# Patient Record
Sex: MALE | Race: WHITE | Employment: OTHER | URBAN - NONMETROPOLITAN AREA
[De-identification: names, ages, dates, MRNs, and addresses within clinical notes are randomized per-mention and may not be internally consistent; named-entity substitution may affect disease eponyms.]

---

## 2018-09-17 ENCOUNTER — RADIANT APPOINTMENT (OUTPATIENT)
Dept: GENERAL RADIOLOGY | Facility: CLINIC | Age: 68
End: 2018-09-17
Attending: FAMILY MEDICINE
Payer: COMMERCIAL

## 2018-09-17 ENCOUNTER — OFFICE VISIT (OUTPATIENT)
Dept: FAMILY MEDICINE | Facility: CLINIC | Age: 68
End: 2018-09-17
Payer: COMMERCIAL

## 2018-09-17 ENCOUNTER — HOSPITAL ENCOUNTER (OUTPATIENT)
Dept: ULTRASOUND IMAGING | Facility: CLINIC | Age: 68
Discharge: HOME | End: 2018-09-17
Attending: FAMILY MEDICINE | Admitting: FAMILY MEDICINE
Payer: MEDICARE

## 2018-09-17 VITALS
OXYGEN SATURATION: 97 % | SYSTOLIC BLOOD PRESSURE: 144 MMHG | TEMPERATURE: 98 F | HEART RATE: 75 BPM | WEIGHT: 228 LBS | BODY MASS INDEX: 35.79 KG/M2 | HEIGHT: 67 IN | DIASTOLIC BLOOD PRESSURE: 78 MMHG | RESPIRATION RATE: 18 BRPM

## 2018-09-17 DIAGNOSIS — R82.90 NONSPECIFIC FINDING ON EXAMINATION OF URINE: ICD-10-CM

## 2018-09-17 DIAGNOSIS — E78.2 MIXED HYPERLIPIDEMIA: ICD-10-CM

## 2018-09-17 DIAGNOSIS — Z00.00 ROUTINE GENERAL MEDICAL EXAMINATION AT A HEALTH CARE FACILITY: Primary | ICD-10-CM

## 2018-09-17 DIAGNOSIS — H91.93 HEARING DIFFICULTY OF BOTH EARS: ICD-10-CM

## 2018-09-17 DIAGNOSIS — Z12.5 SCREENING FOR PROSTATE CANCER: ICD-10-CM

## 2018-09-17 DIAGNOSIS — R50.9 FEVER, UNSPECIFIED FEVER CAUSE: ICD-10-CM

## 2018-09-17 DIAGNOSIS — N30.01 ACUTE CYSTITIS WITH HEMATURIA: ICD-10-CM

## 2018-09-17 DIAGNOSIS — Z11.59 NEED FOR HEPATITIS C SCREENING TEST: ICD-10-CM

## 2018-09-17 DIAGNOSIS — R97.20 ELEVATED PSA, BETWEEN 10 AND LESS THAN 20 NG/ML: Primary | ICD-10-CM

## 2018-09-17 DIAGNOSIS — Z12.11 COLON CANCER SCREENING: ICD-10-CM

## 2018-09-17 DIAGNOSIS — Z71.89 ADVANCED DIRECTIVES, COUNSELING/DISCUSSION: ICD-10-CM

## 2018-09-17 DIAGNOSIS — R06.83 SNORING: ICD-10-CM

## 2018-09-17 LAB
ALBUMIN UR-MCNC: 100 MG/DL
ANION GAP SERPL CALCULATED.3IONS-SCNC: 3 MMOL/L (ref 3–14)
APPEARANCE UR: ABNORMAL
BACTERIA #/AREA URNS HPF: ABNORMAL /HPF
BILIRUB UR QL STRIP: NEGATIVE
BUN SERPL-MCNC: 11 MG/DL (ref 7–30)
CALCIUM SERPL-MCNC: 9.4 MG/DL (ref 8.5–10.1)
CHLORIDE SERPL-SCNC: 101 MMOL/L (ref 94–109)
CHOLEST SERPL-MCNC: 186 MG/DL
CO2 SERPL-SCNC: 31 MMOL/L (ref 20–32)
COLOR UR AUTO: YELLOW
CREAT SERPL-MCNC: 0.79 MG/DL (ref 0.66–1.25)
ERYTHROCYTE [DISTWIDTH] IN BLOOD BY AUTOMATED COUNT: 12.9 % (ref 10–15)
GFR SERPL CREATININE-BSD FRML MDRD: >90 ML/MIN/1.7M2
GLUCOSE SERPL-MCNC: 100 MG/DL (ref 70–99)
GLUCOSE UR STRIP-MCNC: NEGATIVE MG/DL
HCT VFR BLD AUTO: 48 % (ref 40–53)
HDLC SERPL-MCNC: 38 MG/DL
HGB BLD-MCNC: 15.5 G/DL (ref 13.3–17.7)
HGB UR QL STRIP: ABNORMAL
KETONES UR STRIP-MCNC: NEGATIVE MG/DL
LDLC SERPL CALC-MCNC: 111 MG/DL
LEUKOCYTE ESTERASE UR QL STRIP: ABNORMAL
MCH RBC QN AUTO: 29 PG (ref 26.5–33)
MCHC RBC AUTO-ENTMCNC: 32.3 G/DL (ref 31.5–36.5)
MCV RBC AUTO: 90 FL (ref 78–100)
NITRATE UR QL: POSITIVE
NON-SQ EPI CELLS #/AREA URNS LPF: ABNORMAL /LPF
NONHDLC SERPL-MCNC: 148 MG/DL
PH UR STRIP: 5.5 PH (ref 5–7)
PLATELET # BLD AUTO: 381 10E9/L (ref 150–450)
POTASSIUM SERPL-SCNC: 4.4 MMOL/L (ref 3.4–5.3)
PSA SERPL-ACNC: 17.7 UG/L (ref 0–4)
RBC # BLD AUTO: 5.34 10E12/L (ref 4.4–5.9)
RBC #/AREA URNS AUTO: ABNORMAL /HPF
SODIUM SERPL-SCNC: 135 MMOL/L (ref 133–144)
SOURCE: ABNORMAL
SP GR UR STRIP: 1.01 (ref 1–1.03)
TRIGL SERPL-MCNC: 183 MG/DL
UROBILINOGEN UR STRIP-ACNC: 0.2 EU/DL (ref 0.2–1)
WBC # BLD AUTO: 12.4 10E9/L (ref 4–11)
WBC #/AREA URNS AUTO: >100 /HPF

## 2018-09-17 PROCEDURE — 87186 SC STD MICRODIL/AGAR DIL: CPT | Performed by: FAMILY MEDICINE

## 2018-09-17 PROCEDURE — 36415 COLL VENOUS BLD VENIPUNCTURE: CPT | Performed by: FAMILY MEDICINE

## 2018-09-17 PROCEDURE — 87086 URINE CULTURE/COLONY COUNT: CPT | Performed by: FAMILY MEDICINE

## 2018-09-17 PROCEDURE — G0103 PSA SCREENING: HCPCS | Performed by: FAMILY MEDICINE

## 2018-09-17 PROCEDURE — 76770 US EXAM ABDO BACK WALL COMP: CPT

## 2018-09-17 PROCEDURE — 85004 AUTOMATED DIFF WBC COUNT: CPT | Performed by: FAMILY MEDICINE

## 2018-09-17 PROCEDURE — 87088 URINE BACTERIA CULTURE: CPT | Performed by: FAMILY MEDICINE

## 2018-09-17 PROCEDURE — 81001 URINALYSIS AUTO W/SCOPE: CPT | Performed by: FAMILY MEDICINE

## 2018-09-17 PROCEDURE — 85027 COMPLETE CBC AUTOMATED: CPT | Performed by: FAMILY MEDICINE

## 2018-09-17 PROCEDURE — 99213 OFFICE O/P EST LOW 20 MIN: CPT | Mod: 25 | Performed by: FAMILY MEDICINE

## 2018-09-17 PROCEDURE — 71046 X-RAY EXAM CHEST 2 VIEWS: CPT | Mod: FY

## 2018-09-17 PROCEDURE — 80048 BASIC METABOLIC PNL TOTAL CA: CPT | Performed by: FAMILY MEDICINE

## 2018-09-17 PROCEDURE — G0438 PPPS, INITIAL VISIT: HCPCS | Performed by: FAMILY MEDICINE

## 2018-09-17 PROCEDURE — 80061 LIPID PANEL: CPT | Performed by: FAMILY MEDICINE

## 2018-09-17 PROCEDURE — 86803 HEPATITIS C AB TEST: CPT | Performed by: FAMILY MEDICINE

## 2018-09-17 RX ORDER — CIPROFLOXACIN 500 MG/1
500 TABLET, FILM COATED ORAL 2 TIMES DAILY
Qty: 14 TABLET | Refills: 0 | Status: SHIPPED | OUTPATIENT
Start: 2018-09-17

## 2018-09-17 ASSESSMENT — ACTIVITIES OF DAILY LIVING (ADL)
I_NEED_ASSISTANCE_FOR_THE_FOLLOWING_DAILY_ACTIVITIES:: NO ASSISTANCE IS NEEDED
CURRENT_FUNCTION: NO ASSISTANCE NEEDED

## 2018-09-17 NOTE — NURSING NOTE
"Chief Complaint   Patient presents with     Physical       Initial /78 (Cuff Size: Adult Regular)  Pulse 75  Temp 98  F (36.7  C) (Tympanic)  Resp 18  Ht 5' 7.25\" (1.708 m)  Wt 228 lb (103.4 kg)  SpO2 97%  BMI 35.44 kg/m2 Estimated body mass index is 35.44 kg/(m^2) as calculated from the following:    Height as of this encounter: 5' 7.25\" (1.708 m).    Weight as of this encounter: 228 lb (103.4 kg).    Patient presents to the clinic using No DME    Health Maintenance that is potentially due pending provider review:  Colonoscopy/FIT    Gave pt phone number/pended order to schedule mammo and/or colonoscopy(or FIT)    Is there anyone who you would like to be able to receive your results? Not Applicable  If yes have patient fill out ALAN    "

## 2018-09-17 NOTE — PATIENT INSTRUCTIONS
Preventive Health Recommendations:   Male Ages 65 and over    Yearly exam:             See your health care provider every year in order to  o   Review health changes.   o   Discuss preventive care.    o   Review your medicines if your doctor has prescribed any.    Talk with your health care provider about whether you should have a test to screen for prostate cancer (PSA).    Every 3 years, have a diabetes test (fasting glucose). If you are at risk for diabetes, you should have this test more often.    Every 5 years, have a cholesterol test. Have this test more often if you are at risk for high cholesterol or heart disease.     Every 10 years, have a colonoscopy. Or, have a yearly FIT test (stool test). These exams will check for colon cancer.    Talk to with your health care provider about screening for Abdominal Aortic Aneurysm if you have a family history of AAA or have a history of smoking.    Shots:     Get a flu shot each year.     Get a tetanus shot every 10 years.     Talk to your doctor about your pneumonia vaccines. There are now two you should receive - Pneumovax (PPSV 23) and Prevnar (PCV 13).     Talk to your pharmacist about a shingles vaccine.     Talk to your doctor about the hepatitis B vaccine.  Nutrition:     Eat at least 5 servings of fruits and vegetables each day.     Eat whole-grain bread, whole-wheat pasta and brown rice instead of white grains and rice.     Get adequate Calcium and Vitamin D.   Lifestyle    Exercise for at least 150 minutes a week (30 minutes a day, 5 days a week). This will help you control your weight and prevent disease.     Limit alcohol to one drink per day.     No smoking.     Wear sunscreen to prevent skin cancer.     See your dentist every six months for an exam and cleaning.   See your eye doctor every 1 to 2 years to screen for conditions such as glaucoma, macular degeneration, cataracts, etc      * BLADDER INFECTION: Male (Adult)    A bladder infection  "(\"cystitis\" or \"UTI\") usually causes a constant urge to urinate, and a burning when passing urine. Urine may be cloudy, smelly or dark. There may be also be pain in the lower abdomen.  Cystitis in males is not common. It may be caused by a partial blockage in the urinary system that keeps the bladder from emptying completely. This is most often related to an enlarged prostate gland.  HOME CARE:  Drink lots of fluids (at least 6-8 glasses a day). This will flush the bacteria out of your bladder. Cranberry juice has been shown to help clear out the bacteria.  Avoid sexual intercourse until your symptoms are gone.  A bladder infection is treated with antibiotics. You may also be given Pyridium (generic - phenazopyridine) to reduce burning with urination. This will cause urine to become a bright orange color, which can stain clothing.  FOLLOW UP with your doctor or this facility if ALL symptoms have not cleared within five days. It is important to keep your follow up appointment to discuss with your doctor the need for further tests of the urinary tract.  GET PROMPT MEDICAL ATTENTION if any of the following occur:  Fever over 101  F (38.3  C)  No improvement by the third day of treatment  Increasing back or abdominal pain  Repeated vomiting; unable to keep medicine down  Weakness, dizziness or fainting    0158-3398 [employer]. [address] [place] [address], [place] [id number]. All rights reserved. This information is not intended as a substitute for professional medical care. Always follow your healthcare professional's instructions.  This information has been modified by your health care provider with permission from the publisher.    Obstructive Sleep Apnea  Obstructive sleep apnea is a condition that causes your air passages to become narrowed or blocked during sleep. As a result, breathing stops for short periods. Your body wakes up enough for breathing to begin again, though you don't remember it. The cycle " of stopped breathing and brief awakenings can repeat dozens of times a night. This prevents the body from getting to the deeper stages of sleep that are needed for good rest and may cause your body's oxygen level to fall.  Signs of sleep apnea include loud snoring, noisy breathing, and gasping sounds during sleep. Daytime symptoms include waking up tired after a full night's sleep, waking up with headaches, feeling very sleepy or falling asleep during the day, and having problems with memory or concentration.  Risk factors for sleep apnea include:  Being overweight  Being a man, or a woman in menopause  Smoking  Using alcohol or sedating medicines  Having enlarged structures in the nose or throat  Home care  Lifestyle changes that can help treat snoring and sleep apnea include the following:  If you are overweight, lose weight. Talk to your healthcare provider about a weight-loss plan for you.  Avoid alcohol for 3 to 4 hours before bedtime. Avoid sedating medications. Ask your healthcare provider about the medicines you take.  If you smoke, talk to your healthcare provider about ways to quit.  Sleep on your side. This can help prevent gravity from pulling relaxed throat tissues into your breathing passages.  If you have allergies or sinus problems that block your nose, ask your healthcare provider for help.  Follow-up care  Follow up with your healthcare provider, or as advised. A diagnosis of sleep apnea is made with a sleep study. Your healthcare provider can tell you more about this test.  When to seek medical advice  Sleep apnea can make you more likely to have certain health problems. These include high blood pressure, heart attack, stroke, and sexual dysfunction. If you have sleep apnea, talk to your healthcare provider about the best treatments for you.  Date Last Reviewed: 4/1/2017 2000-2017 [employer]. [address] [place] [address], [place] [address]. All rights reserved. This information is not  intended as a substitute for professional medical care. Always follow your healthcare professional's instructions.

## 2018-09-17 NOTE — PROGRESS NOTES
SUBJECTIVE:   Kameron Spear is a 68 year old male who presents for Preventive Visit.    Are you in the first 12 months of your Medicare coverage?  No    Physical   Annual:     Getting at least 3 servings of Calcium per day:  Yes    Bi-annual eye exam:  Yes    Dental care twice a year:  Yes    Sleep apnea or symptoms of sleep apnea:  Excessive snoring    Diet:  Regular (no restrictions)    Frequency of exercise:  2-3 days/week    Duration of exercise:  Less than 15 minutes    Taking medications regularly:  Yes    Medication side effects:  None    Additional concerns today:  YES    Ability to successfully perform activities of daily living: no assistance needed    Home Safety:  No safety concerns identified    Hearing Impairment: difficulty following a conversation in a noisy restaurant or crowded room, feel that people are mumbling or not speaking clearly, difficult to understand a speaker at a public meeting or [employer] service, need to ask people to speak up or repeat themselves, find that men's voices are easier to understand than woman's and difficulty understanding soft or whispered speech        Fall risk:  Fallen 2 or more times in the past year?: No  Any fall with injury in the past year?: No      Reviewed and updated as needed this visit by clinical staff  Tobacco  Allergies  Meds  Med Hx  Surg Hx  Fam Hx  Soc Hx        Reviewed and updated as needed this visit by Provider        Social History   Substance Use Topics     Smoking status: Never Smoker     Smokeless tobacco: Never Used     Alcohol use Yes      Comment: Rare       Alcohol Use 9/17/2018   If you drink alcohol do you typically have greater than 3 drinks per day OR greater than 7 drinks per week? Not Applicable         PROBLEMS TO ADD ON...  Was at the dentist and has a lot of cavity activity going on. Was told to come see  For lab work as that could be an indication of something else going on.     On clindamycin for dental  infection- having some possible side effects from it.  Is very sweaty today. Had a fever earlier this week- 104 earlier. 100.8 this am.       Today's PHQ-2 Score:   PHQ-2 ( 1999 [employer]) 9/17/2018   Q1: Little interest or pleasure in doing things 0   Q2: Feeling down, depressed or hopeless 0   PHQ-2 Score 0   Q1: Little interest or pleasure in doing things Not at all   Q2: Feeling down, depressed or hopeless Not at all   PHQ-2 Score 0       Do you feel safe in your environment - Yes    Do you have a Health Care Directive?: No: Advance care planning reviewed with patient; information given to patient to review.    Current providers sharing in care for this patient include:   Patient Care Team:  No Ref-Primary, Physician as PCP - General    The following health maintenance items are reviewed in Epic and correct as of today:  Health Maintenance   Topic Date Due     PHQ-2 Q1 YR  04/28/1963     HEPATITIS C SCREENING  04/28/1969     COLON CANCER SCREEN (SYSTEM ASSIGNED)  04/28/2001     ADVANCE DIRECTIVE PLANNING Q5 YRS  04/28/2006     TETANUS IMMUNIZATION (SYSTEM ASSIGNED)  10/13/2014     FALL RISK ASSESSMENT  04/28/2016     PNEUMOCOCCAL (1 of 2 - PCV13) 04/28/2016     AORTIC ANEURYSM SCREENING (SYSTEM ASSIGNED)  04/28/2016     LIPID SCREEN Q5 YR MALE (SYSTEM ASSIGNED)  12/23/2016     INFLUENZA VACCINE (1) 09/01/2018     Labs reviewed in EPIC  BP Readings from Last 3 Encounters:   09/17/18 144/78   10/27/11 124/62    Wt Readings from Last 3 Encounters:   09/17/18 228 lb (103.4 kg)   10/27/11 225 lb (102.1 kg)                  Patient Active Problem List   Diagnosis     CARDIOVASCULAR SCREENING; LDL GOAL LESS THAN 160     Advanced directives, counseling/discussion     History reviewed. No pertinent surgical history.    Social History   Substance Use Topics     Smoking status: Never Smoker     Smokeless tobacco: Never Used     Alcohol use Yes      Comment: Rare     History reviewed. No pertinent family history.      Current  "Outpatient Prescriptions   Medication Sig Dispense Refill     Multiple Vitamins TABS Take  by mouth daily.       No Known Allergies  Recent Labs   Lab Test  12/23/11   0835   LDL  144*   HDL  41   TRIG  345*   CR  0.71   GFRESTIMATED  >90   GFRESTBLACK  >90            Review of Systems  Constitutional, HEENT, cardiovascular, pulmonary, GI, , musculoskeletal, neuro, skin, endocrine and psych systems are negative, except as otherwise noted.    OBJECTIVE:   /78 (Cuff Size: Adult Regular)  Pulse 75  Temp 98  F (36.7  C) (Tympanic)  Resp 18  Ht 5' 7.25\" (1.708 m)  Wt 228 lb (103.4 kg)  SpO2 97%  BMI 35.44 kg/m2 Estimated body mass index is 35.44 kg/(m^2) as calculated from the following:    Height as of this encounter: 5' 7.25\" (1.708 m).    Weight as of this encounter: 228 lb (103.4 kg).  Physical Exam  GENERAL: alert, no distress and elderly  EYES: Eyes grossly normal to inspection, PERRL and conjunctivae and sclerae normal  HENT: ear canals and TM's normal, nose and mouth without ulcers or lesions  NECK: no adenopathy, no asymmetry, masses, or scars and thyroid normal to palpation  RESP: lungs clear to auscultation - no rales, rhonchi or wheezes  CV: regular rates and rhythm, normal S1 S2, no S3 or S4 and no murmur, click or rub  ABDOMEN: soft, nontender, no hepatosplenomegaly, no masses and bowel sounds normal  MS: no gross musculoskeletal defects noted, no edema  NEURO: Normal strength and tone, mentation intact and speech normal  PSYCH: mentation appears normal, affect normal/bright    Results for orders placed or performed in visit on 09/17/18   CBC with platelets   Result Value Ref Range    WBC 12.4 (H) 4.0 - 11.0 10e9/L    RBC Count 5.34 4.4 - 5.9 10e12/L    Hemoglobin 15.5 13.3 - 17.7 g/dL    Hematocrit 48.0 40.0 - 53.0 %    MCV 90 78 - 100 fl    MCH 29.0 26.5 - 33.0 pg    MCHC 32.3 31.5 - 36.5 g/dL    RDW 12.9 10.0 - 15.0 %    Platelet Count 381 150 - 450 10e9/L   UA reflex to Microscopic "   Result Value Ref Range    Color Urine Yellow     Appearance Urine Cloudy     Glucose Urine Negative NEG^Negative mg/dL    Bilirubin Urine Negative NEG^Negative    Ketones Urine Negative NEG^Negative mg/dL    Specific Gravity Urine 1.015 1.003 - 1.035    Blood Urine Moderate (A) NEG^Negative    pH Urine 5.5 5.0 - 7.0 pH    Protein Albumin Urine 100 (A) NEG^Negative mg/dL    Urobilinogen Urine 0.2 0.2 - 1.0 EU/dL    Nitrite Urine Positive (A) NEG^Negative    Leukocyte Esterase Urine Large (A) NEG^Negative    Source Midstream Urine    Urine Microscopic   Result Value Ref Range    WBC Urine >100 (A) OTO5^0 - 5 /HPF    RBC Urine 25-50 (A) OTO2^O - 2 /HPF    Squamous Epithelial /LPF Urine Few FEW^Few /LPF    Bacteria Urine Many (A) NEG^Negative /HPF       ASSESSMENT / PLAN:       ICD-10-CM    1. Routine general medical examination at a health care facility Z00.00 Lipid panel reflex to direct LDL Fasting     Basic metabolic panel     CBC with platelets     Urine Microscopic   2. Colon cancer screening Z12.11 GASTROENTEROLOGY ADULT REF PROCEDURE ONLY   3. Advanced directives, counseling/discussion Z71.89    4. Snoring R06.83 SLEEP EVALUATION & MANAGEMENT REFERRAL - [facility]  [phone] (Age 2 and up)   5. Screening for prostate cancer Z12.5 PSA, screen   6. Hearing difficulty of both ears H91.93     has hearing aids which is working well, following audiology in [place].    7. Need for hepatitis C screening test Z11.59 Hepatitis C antibody   8. Fever, unspecified fever cause R50.9 UA reflex to Microscopic     XR Chest 2 Views     Differential   9. Nonspecific finding on examination of urine R82.90 Urine Culture Aerobic Bacterial   10. Acute cystitis with hematuria N30.01 ciprofloxacin (CIPRO) 500 MG tablet     US Renal Complete     UA findings consistent with acute cystitis.  Ciprofloxacin prescribed, common side effect discussed.  Suggested to continue well hydration.  We will do renal  "ultrasound for further evaluation.  CBC, BMP ordered to monitor electrolytes, cell counts and hemoglobin.  Chest x-ray came back unremarkable.  Patient complaining of snoring, obese, high risk of sleep apnea, sleep medicine referral placed.  Prostate cancer screening discussed, risks and benefits explained, patient would like to do PSA screening, order placed.  Hepatitis C screening and screening colonoscopy ordered as per current guidelines.  Follow-up in 2 weeks or earlier if needed.  Patient understood and in agreement with above plan.  All questions answered.        End of Life Planning:  Patient currently has an advanced directive: No.  I have verified the patient's ablity to prepare an advanced directive/make health care decisions.  Literature was provided to assist patient in preparing an advanced directive.    COUNSELING:  Reviewed preventive health counseling, as reflected in patient instructions    BP Readings from Last 1 Encounters:   09/17/18 144/78     Estimated body mass index is 35.44 kg/(m^2) as calculated from the following:    Height as of this encounter: 5' 7.25\" (1.708 m).    Weight as of this encounter: 228 lb (103.4 kg).      Weight management plan: Discussed healthy diet and exercise guidelines and patient will follow up in 12 months in clinic to re-evaluate.     reports that he has never smoked. He has never used smokeless tobacco.      Appropriate preventive services were discussed with this patient, including applicable screening as appropriate for cardiovascular disease, diabetes, osteopenia/osteoporosis, and glaucoma.  As appropriate for age/gender, discussed screening for colorectal cancer, prostate cancer, breast cancer, and cervical cancer. Checklist reviewing preventive services available has been given to the patient.    Reviewed patients plan of care and provided an AVS. The Basic Care Plan (routine screening as documented in Health Maintenance) for Kameron meets the Care Plan " requirement. This Care Plan has been established and reviewed with the Patient.    Counseling Resources:  ATP IV Guidelines  Pooled Cohorts Equation Calculator  Breast Cancer Risk Calculator  FRAX Risk Assessment  ICSI Preventive Guidelines  Dietary Guidelines for Americans, 2010  USDA's MyPlate  ASA Prophylaxis  Lung CA Screening      Mark Young MD  [facility]

## 2018-09-17 NOTE — MR AVS SNAPSHOT
After Visit Summary   9/17/2018    Kameron Spear    MRN: [id number]           Patient Information     Date Of Birth          1950        Visit Information        Provider Department      9/17/2018 8:20 AM Mark Young MD [facility]        Today's Diagnoses     Routine general medical examination at a health care facility    -  1    Colon cancer screening        Advanced directives, counseling/discussion        Snoring        Screening for prostate cancer        Hearing difficulty of both ears        Need for hepatitis C screening test        Fever, unspecified fever cause        Nonspecific finding on examination of urine        Acute cystitis with hematuria          Care Instructions      Preventive Health Recommendations:   Male Ages 65 and over    Yearly exam:             See your health care provider every year in order to  o   Review health changes.   o   Discuss preventive care.    o   Review your medicines if your doctor has prescribed any.    Talk with your health care provider about whether you should have a test to screen for prostate cancer (PSA).    Every 3 years, have a diabetes test (fasting glucose). If you are at risk for diabetes, you should have this test more often.    Every 5 years, have a cholesterol test. Have this test more often if you are at risk for high cholesterol or heart disease.     Every 10 years, have a colonoscopy. Or, have a yearly FIT test (stool test). These exams will check for colon cancer.    Talk to with your health care provider about screening for Abdominal Aortic Aneurysm if you have a family history of AAA or have a history of smoking.    Shots:     Get a flu shot each year.     Get a tetanus shot every 10 years.     Talk to your doctor about your pneumonia vaccines. There are now two you should receive - Pneumovax (PPSV 23) and Prevnar (PCV 13).     Talk to your pharmacist about a shingles vaccine.     Talk to your doctor  "about the hepatitis B vaccine.  Nutrition:     Eat at least 5 servings of fruits and vegetables each day.     Eat whole-grain bread, whole-wheat pasta and brown rice instead of white grains and rice.     Get adequate Calcium and Vitamin D.   Lifestyle    Exercise for at least 150 minutes a week (30 minutes a day, 5 days a week). This will help you control your weight and prevent disease.     Limit alcohol to one drink per day.     No smoking.     Wear sunscreen to prevent skin cancer.     See your dentist every six months for an exam and cleaning.   See your eye doctor every 1 to 2 years to screen for conditions such as glaucoma, macular degeneration, cataracts, etc      * BLADDER INFECTION: Male (Adult)    A bladder infection (\"cystitis\" or \"UTI\") usually causes a constant urge to urinate, and a burning when passing urine. Urine may be cloudy, smelly or dark. There may be also be pain in the lower abdomen.  Cystitis in males is not common. It may be caused by a partial blockage in the urinary system that keeps the bladder from emptying completely. This is most often related to an enlarged prostate gland.  HOME CARE:  Drink lots of fluids (at least 6-8 glasses a day). This will flush the bacteria out of your bladder. Cranberry juice has been shown to help clear out the bacteria.  Avoid sexual intercourse until your symptoms are gone.  A bladder infection is treated with antibiotics. You may also be given Pyridium (generic - phenazopyridine) to reduce burning with urination. This will cause urine to become a bright orange color, which can stain clothing.  FOLLOW UP with your doctor or this facility if ALL symptoms have not cleared within five days. It is important to keep your follow up appointment to discuss with your doctor the need for further tests of the urinary tract.  GET PROMPT MEDICAL ATTENTION if any of the following occur:  Fever over 101  F (38.3  C)  No improvement by the third day of " treatment  Increasing back or abdominal pain  Repeated vomiting; unable to keep medicine down  Weakness, dizziness or fainting    4589-1349 [employer]. [address] [place] [address], [place] [address]. All rights reserved. This information is not intended as a substitute for professional medical care. Always follow your healthcare professional's instructions.  This information has been modified by your health care provider with permission from the publisher.    Obstructive Sleep Apnea  Obstructive sleep apnea is a condition that causes your air passages to become narrowed or blocked during sleep. As a result, breathing stops for short periods. Your body wakes up enough for breathing to begin again, though you don't remember it. The cycle of stopped breathing and brief awakenings can repeat dozens of times a night. This prevents the body from getting to the deeper stages of sleep that are needed for good rest and may cause your body's oxygen level to fall.  Signs of sleep apnea include loud snoring, noisy breathing, and gasping sounds during sleep. Daytime symptoms include waking up tired after a full night's sleep, waking up with headaches, feeling very sleepy or falling asleep during the day, and having problems with memory or concentration.  Risk factors for sleep apnea include:  Being overweight  Being a man, or a woman in menopause  Smoking  Using alcohol or sedating medicines  Having enlarged structures in the nose or throat  Home care  Lifestyle changes that can help treat snoring and sleep apnea include the following:  If you are overweight, lose weight. Talk to your healthcare provider about a weight-loss plan for you.  Avoid alcohol for 3 to 4 hours before bedtime. Avoid sedating medications. Ask your healthcare provider about the medicines you take.  If you smoke, talk to your healthcare provider about ways to quit.  Sleep on your side. This can help prevent gravity from pulling relaxed throat  tissues into your breathing passages.  If you have allergies or sinus problems that block your nose, ask your healthcare provider for help.  Follow-up care  Follow up with your healthcare provider, or as advised. A diagnosis of sleep apnea is made with a sleep study. Your healthcare provider can tell you more about this test.  When to seek medical advice  Sleep apnea can make you more likely to have certain health problems. These include high blood pressure, heart attack, stroke, and sexual dysfunction. If you have sleep apnea, talk to your healthcare provider about the best treatments for you.  Date Last Reviewed: 4/1/2017 2000-2017 [employer]. [address] [place] [address]. All rights reserved. This information is not intended as a substitute for professional medical care. Always follow your healthcare professional's instructions.                  Follow-ups after your visit        Additional Services     GASTROENTEROLOGY ADULT REF PROCEDURE ONLY       Last Lab Result: Creatinine (mg/dL)       Date                     Value                 12/23/2011               0.71             ----------  Body mass index is 35.44 kg/(m^2).     Needed:  No  Language:  English    Patient will be contacted to schedule procedure.     Please be aware that coverage of these services is subject to the terms and limitations of your health insurance plan.  Call member services at your health plan with any benefit or coverage questions.  Any procedures must be performed at a [facility] facility OR coordinated by your clinic's referral office.    Please bring the following with you to your appointment:    (1) Any X-Rays, CTs or MRIs which have been performed.  Contact the facility where they were done to arrange for  prior to your scheduled appointment.    (2) List of current medications   (3) This referral request   (4) Any documents/labs given to you for this referral            SLEEP  "EVALUATION & MANAGEMENT REFERRAL - ADULT -[facility]  [phone] (Age 2 and up)       Please be aware that coverage of these services is subject to the terms and limitations of your health insurance plan.  Call member services at your health plan with any benefit or coverage questions.      Please bring the following to your appointment:    >>   List of current medications   >>   This referral request   >>   Any documents/labs given to you for this referral                      Future tests that were ordered for you today     Open Future Orders        Priority Expected Expires Ordered    SLEEP EVALUATION & MANAGEMENT REFERRAL - ADULT -[facility]  [phone] (Age 2 and up) Routine  9/17/2019 9/17/2018            Who to contact     If you have questions or need follow up information about today's clinic visit or your schedule please contact [facility] directly at [phone].  Normal or non-critical lab and imaging results will be communicated to you by MyChart, letter or phone within 4 business days after the clinic has received the results. If you do not hear from us within 7 days, please contact the clinic through MyChart or phone. If you have a critical or abnormal lab result, we will notify you by phone as soon as possible.  Submit refill requests through [employer] or call your pharmacy and they will forward the refill request to us. Please allow 3 business days for your refill to be completed.          Additional Information About Your Visit        Care EveryWhere ID     This is your Care EveryWhere ID. This could be used by other organizations to access your [facility] medical records  [id number]        Your Vitals Were     Pulse Temperature Respirations Height Pulse Oximetry BMI (Body Mass Index)    75 98  F (36.7  C) (Tympanic) 18 5' 7.25\" (1.708 m) 97% 35.44 kg/m2       Blood Pressure from Last 3 Encounters:   09/17/18 144/78   10/27/11 124/62    " Weight from Last 3 Encounters:   09/17/18 228 lb (103.4 kg)   10/27/11 225 lb (102.1 kg)              We Performed the Following     Basic metabolic panel     CBC with platelets     GASTROENTEROLOGY ADULT REF PROCEDURE ONLY     Hepatitis C antibody     Lipid panel reflex to direct LDL Fasting     PSA, screen     UA reflex to Microscopic     Urine Culture Aerobic Bacterial     Urine Microscopic          Today's Medication Changes          These changes are accurate as of 9/17/18  9:38 AM.  If you have any questions, ask your nurse or doctor.               Start taking these medicines.        Dose/Directions    ciprofloxacin 500 MG tablet   Commonly known as:  CIPRO   Used for:  Acute cystitis with hematuria   Started by:  Mark Young MD        Dose:  500 mg   Take 1 tablet (500 mg) by mouth 2 times daily   Quantity:  14 tablet   Refills:  0            Where to get your medicines      These medications were sent to [facility] [address] [place] [address] [address]  [address] [address]     Phone:  [phone]     ciprofloxacin 500 MG tablet                Primary Care Provider Fax #    Physician No Ref-Primary [phone]       No address on file        Equal Access to Services     [facility] : Hadii aad ku hadasho Soomaali, waaxda luqadaha, qaybta kaalmada adeegyadarvin, tj badillo . So [facility] [phone].    ATENCIÓN: Si habla español, tiene a castillo disposición servicios gratuitos de asistencia lingüística. Llame al [phone].    We comply with applicable federal civil rights laws and [place] laws. We do not discriminate on the basis of race, color, national origin, age, disability, sex, sexual orientation, or gender identity.            Thank you!     Thank you for choosing [facility]  for your care. Our goal is always to provide you with excellent care. Hearing back from our patients is one way we can continue to improve our services.  Please take a few minutes to complete the written survey that you may receive in the mail after your visit with us. Thank you!             Your Updated Medication List - Protect others around you: Learn how to safely use, store and throw away your medicines at www.disposemymeds.org.          This list is accurate as of 9/17/18  9:38 AM.  Always use your most recent med list.                   Brand Name Dispense Instructions for use Diagnosis    ciprofloxacin 500 MG tablet    CIPRO    14 tablet    Take 1 tablet (500 mg) by mouth 2 times daily    Acute cystitis with hematuria       Multiple vitamin  s Tabs      Take  by mouth daily.

## 2018-09-18 LAB
BASOPHILS # BLD AUTO: 0.1 10E9/L (ref 0–0.2)
BASOPHILS NFR BLD AUTO: 0.4 %
DIFFERENTIAL METHOD BLD: ABNORMAL
EOSINOPHIL # BLD AUTO: 0.1 10E9/L (ref 0–0.7)
EOSINOPHIL NFR BLD AUTO: 1 %
HCV AB SERPL QL IA: NONREACTIVE
IMM GRANULOCYTES # BLD: 0.1 10E9/L (ref 0–0.4)
IMM GRANULOCYTES NFR BLD: 0.9 %
LYMPHOCYTES # BLD AUTO: 3 10E9/L (ref 0.8–5.3)
LYMPHOCYTES NFR BLD AUTO: 23.9 %
MONOCYTES # BLD AUTO: 0.9 10E9/L (ref 0–1.3)
MONOCYTES NFR BLD AUTO: 6.9 %
NEUTROPHILS # BLD AUTO: 8.5 10E9/L (ref 1.6–8.3)
NEUTROPHILS NFR BLD AUTO: 66.9 %
NRBC # BLD AUTO: 0 10*3/UL
NRBC BLD AUTO-RTO: 0 /100

## 2018-09-19 LAB
BACTERIA SPEC CULT: ABNORMAL
BACTERIA SPEC CULT: ABNORMAL
Lab: ABNORMAL
SPECIMEN SOURCE: ABNORMAL

## 2018-10-03 ENCOUNTER — OFFICE VISIT (OUTPATIENT)
Dept: UROLOGY | Facility: CLINIC | Age: 68
End: 2018-10-03
Payer: COMMERCIAL

## 2018-10-03 VITALS — RESPIRATION RATE: 16 BRPM | HEART RATE: 75 BPM | SYSTOLIC BLOOD PRESSURE: 119 MMHG | DIASTOLIC BLOOD PRESSURE: 77 MMHG

## 2018-10-03 DIAGNOSIS — R97.20 ELEVATED PROSTATE SPECIFIC ANTIGEN (PSA): ICD-10-CM

## 2018-10-03 DIAGNOSIS — R31.0 GROSS HEMATURIA: Primary | ICD-10-CM

## 2018-10-03 PROCEDURE — 88112 CYTOPATH CELL ENHANCE TECH: CPT | Performed by: UROLOGY

## 2018-10-03 PROCEDURE — 99203 OFFICE O/P NEW LOW 30 MIN: CPT | Mod: 25 | Performed by: UROLOGY

## 2018-10-03 PROCEDURE — 51798 US URINE CAPACITY MEASURE: CPT | Performed by: UROLOGY

## 2018-10-03 NOTE — NURSING NOTE
"Initial /77 (BP Location: Right arm, Patient Position: Chair, Cuff Size: Adult Regular)  Pulse 75  Resp 16 Estimated body mass index is 35.44 kg/(m^2) as calculated from the following:    Height as of 9/17/18: 1.708 m (5' 7.25\").    Weight as of 9/17/18: 103.4 kg (228 lb). .    Patient is here for a consult for elevated psa.  bell rhodes LPN    "

## 2018-10-03 NOTE — MR AVS SNAPSHOT
After Visit Summary   10/3/2018    Kameron Spear    MRN: [id number]           Patient Information     Date Of Birth          1950        Visit Information        Provider Department      10/3/2018 2:30 PM Berny Messina MD [facility]        Today's Diagnoses     Gross hematuria    -  1    Elevated prostate specific antigen (PSA)           Follow-ups after your visit        Your next 10 appointments already scheduled     Jan 07, 2019  8:30 AM CST   [facility]   [facility]    [address] [address]   [phone]           Please do not eat 10-12 hours before your appointment if you are coming in fasting for labs on lipids, cholesterol, or glucose (sugar). This does not apply to pregnant women. Water, hot tea and black coffee (with nothing added) are okay. Do not drink other fluids, diet soda or chew gum.            Jan 16, 2019 10:00 AM CST   Return Visit with Berny Messina MD   [facility]    [address] [place] [address]   [phone]              Who to contact     If you have questions or need follow up information about today's clinic visit or your schedule please contact [facility] directly at [phone].  Normal or non-critical lab and imaging results will be communicated to you by MyChart, letter or phone within 4 business days after the clinic has received the results. If you do not hear from us within 7 days, please contact the clinic through MyChart or phone. If you have a critical or abnormal lab result, we will notify you by phone as soon as possible.  Submit refill requests through [employer] or call your pharmacy and they will forward the refill request to us. Please allow 3 business days for your refill to be completed.          Additional Information About Your Visit        Care EveryWhere ID      This is your Care EveryWhere ID. This could be used by other organizations to access your [facility] medical records  [id number]        Your Vitals Were     Pulse Respirations                75 16           Blood Pressure from Last 3 Encounters:   10/24/18 134/71   10/03/18 119/77   09/17/18 144/78    Weight from Last 3 Encounters:   09/17/18 103.4 kg (228 lb)   10/27/11 102.1 kg (225 lb)              We Performed the Following     Cytology non gyn     MEASURE POST-VOID RESIDUAL URINE/BLADDER CAPACITY, US NON-IMAGING        Primary Care Provider Fax #    Physician No Ref-Primary [phone]       No address on file        Equal Access to Services     [facility] : Hadrosalina Webb, judith alvarez, onur oroalmadarvin smith, tj badillo . So [facility] [phone].    ATENCIÓN: Si habla español, tiene a castillo disposición servicios gratuitos de asistencia lingüística. Llame al [phone].    We comply with applicable federal civil rights laws and [place] laws. We do not discriminate on the basis of race, color, national origin, age, disability, sex, sexual orientation, or gender identity.            Thank you!     Thank you for choosing [facility]  for your care. Our goal is always to provide you with excellent care. Hearing back from our patients is one way we can continue to improve our services. Please take a few minutes to complete the written survey that you may receive in the mail after your visit with us. Thank you!             Your Updated Medication List - Protect others around you: Learn how to safely use, store and throw away your medicines at www.disposemymeds.org.          This list is accurate as of 10/3/18 11:59 PM.  Always use your most recent med list.                   Brand Name Dispense Instructions for use Diagnosis    ciprofloxacin 500 MG tablet    CIPRO    14 tablet    Take 1 tablet (500 mg) by mouth 2 times daily    Acute cystitis with  hematuria       Multiple vitamin  s Tabs      Take  by mouth daily.

## 2018-10-04 NOTE — PROGRESS NOTES
Visit Date:   10/03/2018      REASON FOR VISIT TODAY:  Gross hematuria and elevated PSA.      HISTORY OF PRESENT ILLNESS:  Mr. Spear is a 68-year-old gentleman who comes to see us at the request of Dr. Young in consultation for an elevated PSA and gross hematuria.  The patient notes that on September 17 he had noted some brown urine for approximately 1 day.  He then had some bright red blood and clots and possibly some whitish tissue passing in his urine.  He notes that this episode was by in large painless.  He denied previous episodes.  The patient was evaluated with a urinalysis that day showing greater than 100 white blood cells and 25-50 red blood cells per high-power field, positive nitrites.  The patient's urine culture ultimately grew out ,000 colonies of E. coli and the patient was treated with Cipro.  He notes that the blood has subsequently gone away.  The patient also had a renal ultrasound that day that was negative for hydronephrosis.  In addition, a PSA was obtained that day that was measured at 17.7.      PAST MEDICAL HISTORY:  Otherwise significant for what he reports as kidney infection as a kid.      PAST SURGICAL HISTORY:  Includes a tonsillectomy and lipoma removal from his shoulder.      MEDICATIONS:  Include recently completed Cipro.      ALLERGIES:  NO KNOWN DRUG ALLERGIES.      SOCIAL HISTORY:  Negative for tobacco.  Negative for alcohol.      FAMILY HISTORY:  Negative for urologic malignancies.      REVIEW OF SYSTEMS:  Negative for fevers, chills, sweats, nausea, vomiting, unexplained weight changes.      PHYSICAL EXAMINATION:   VITAL SIGNS:  On exam today, the patient's blood pressure is 119/77, pulse is 75.   GENERAL:  He is in no acute distress.   HEENT:  His head is atraumatic through there is some slight swelling of his right cheek from recent root canal.  Otherwise, he has a conjugate gaze with reactive pupils, appears to have normal range of motion of the neck.    LUNGS:   He has nonlabored breathing.   ABDOMEN:  Obese but soft and nontender.  No flank tenderness to percussion.   GENITALIA:  Orthotopic meatus.  Testes are descended bilaterally and are nontender.  No hernia appreciable today.   RECTAL:  Digital rectal exam revealed a prostate that was enlarged and sort of diffusely firm but no discrete nodularity was noted.     EXTREMITIES:  There was no peripheral edema.  The visible portions of the skin were without rashes.        The patient was able to void and a postvoid residual was measured at 101 mL.      ASSESSMENT AND PLAN:  Over half of today's consult was spent counseling the patient regarding his gross hematuria and his elevated PSA.  I suggested to Mr. Spear that there are 3 issues here that we will treat independently, though they certainly may be related.  The first is the patient's gross hematuria, the second is the patient's urinary tract infection, and the third is the patient's elevated PSA.  In terms of the gross hematuria, I suggested to the patient that we would need to complete a full hematuria evaluation since the patient essentially had painless gross hematuria.  We will ask him to leave a urine today for cytology and then we will have him come back and obtain a CT urogram and a cystoscopy at that time to complete his blood in the urine workup.  The cystoscopy will also allow us to evaluate the prostate and see if the patient's prostatic anatomy is concerning for obstruction, which may help explain the mild postvoid residual that the patient has but this still may be enough in this person to predispose to the urinary tract infection which was noted.  Lastly, the patient's elevated PSA is of unclear etiology at this time as well.  Certainly, this could be due to the urinary tract infection which the patient appeared to have at the time the PSA was drawn but certainly we cannot exclude prostate cancer at this point.  However, we will start just by repeating  the PSA in a few weeks given the fact that the patient did have a urinary tract infection going on at that time.  We will see the patient back in a couple weeks after his CT urogram for his cystoscopy and he will get a PSA drawn just prior.         ROBYN RUST MD             D: 10/03/2018   T: 10/04/2018   MT: ROBIN      Name:     PETER ALLEN   MRN:      [id number]        Account:      [id number]   :      1950           Visit Date:   10/03/2018      Document: [id number]

## 2018-10-05 LAB — COPATH REPORT: NORMAL

## 2018-10-23 DIAGNOSIS — R97.20 ELEVATED PROSTATE SPECIFIC ANTIGEN (PSA): ICD-10-CM

## 2018-10-23 DIAGNOSIS — R31.0 GROSS HEMATURIA: ICD-10-CM

## 2018-10-23 LAB
CREAT SERPL-MCNC: 0.66 MG/DL (ref 0.66–1.25)
GFR SERPL CREATININE-BSD FRML MDRD: >90 ML/MIN/1.7M2
PSA SERPL-MCNC: 12.4 UG/L (ref 0–4)

## 2018-10-23 PROCEDURE — 36415 COLL VENOUS BLD VENIPUNCTURE: CPT | Performed by: UROLOGY

## 2018-10-23 PROCEDURE — 82565 ASSAY OF CREATININE: CPT | Performed by: UROLOGY

## 2018-10-23 PROCEDURE — 84153 ASSAY OF PSA TOTAL: CPT | Performed by: UROLOGY

## 2018-10-24 ENCOUNTER — OFFICE VISIT (OUTPATIENT)
Dept: UROLOGY | Facility: CLINIC | Age: 68
End: 2018-10-24
Payer: COMMERCIAL

## 2018-10-24 ENCOUNTER — HOSPITAL ENCOUNTER (OUTPATIENT)
Dept: CT IMAGING | Facility: CLINIC | Age: 68
Discharge: HOME | End: 2018-10-24
Attending: UROLOGY | Admitting: UROLOGY
Payer: MEDICARE

## 2018-10-24 VITALS — DIASTOLIC BLOOD PRESSURE: 71 MMHG | RESPIRATION RATE: 16 BRPM | SYSTOLIC BLOOD PRESSURE: 134 MMHG | HEART RATE: 73 BPM

## 2018-10-24 DIAGNOSIS — R97.20 ELEVATED PROSTATE SPECIFIC ANTIGEN (PSA): Primary | ICD-10-CM

## 2018-10-24 DIAGNOSIS — R31.0 GROSS HEMATURIA: ICD-10-CM

## 2018-10-24 PROCEDURE — 74178 CT ABD&PLV WO CNTR FLWD CNTR: CPT

## 2018-10-24 PROCEDURE — 88305 TISSUE EXAM BY PATHOLOGIST: CPT | Performed by: UROLOGY

## 2018-10-24 PROCEDURE — 25000125 ZZHC RX 250: Performed by: RADIOLOGY

## 2018-10-24 PROCEDURE — 25000128 H RX IP 250 OP 636: Performed by: RADIOLOGY

## 2018-10-24 PROCEDURE — 52000 CYSTOURETHROSCOPY: CPT | Performed by: UROLOGY

## 2018-10-24 RX ORDER — IOPAMIDOL 755 MG/ML
100 INJECTION, SOLUTION INTRAVASCULAR ONCE
Status: COMPLETED | OUTPATIENT
Start: 2018-10-24 | End: 2018-10-24

## 2018-10-24 RX ADMIN — IOPAMIDOL 100 ML: 755 INJECTION, SOLUTION INTRAVENOUS at 08:38

## 2018-10-24 RX ADMIN — SODIUM CHLORIDE 80 ML: 9 INJECTION, SOLUTION INTRAVENOUS at 08:38

## 2018-10-24 NOTE — PROGRESS NOTES
Visit Date:   10/24/2018      REASON FOR VISIT TODAY:  Cystoscopy for gross hematuria.      BRIEF COURSE:  Mr. Spear is a 68-year-old gentleman followed in our clinic for recent diagnosis of gross hematuria.  He presents today for cystoscopy as part of his evaluation.      After informed consent was obtained, the patient was prepped and draped in standard sterile fashion.  A flexible cystoscope was introduced into the patient's bladder without difficulty.  Inspection of the bladder revealed orthotopic UOs effluxing clear urine.  There were some trabeculations noted in the bladder.  There was a bladder diverticulum noted right in the dome of the bladder.  A small blood blister present.  Similarly, a blood blister was present on the left lateral wall as well.  There were other areas scattered throughout the bladder that appeared to be consistent with chronic inflammation.  Just posterior to the left UO, there was an area likely chronic inflammation but did have 1-2 frondular growths on it.  We attempted to biopsy this area, but could not achieve the necessary angles with the biopsy device in place given the enlargement of the prostate.  On retroflexion, there was circumferential protrusion of prostate tissue into the bladder, but no other mucosal lesions, stones or foreign objects otherwise noted in the bladder.  Upon pulling the scope back into the prostatic fossa, there did appear to be some robust overgrowth of tissue on the surface of the prostate which was in fact biopsied.      The patient has a CT scan from today that shows 2 nonobstructing stones in the right kidney measuring up to 4 mm in size.  There were no ureteral calculi.  There is no hydronephrosis and no kidney masses noted.  Two tiny pulmonary nodules were noted, but as the patient does not have a history of smoking, no further followup of this is needed per radiology recommendations.  The patient has a urine cytology from 10/03/2018 which is  undetectable      ASSESSMENT AND PLAN:  I suggested to Mr. Allen that we are pleased to see there were no obvious sinister lesions noted in his bladder.  Of note, the patient does have a PSA of 12.4 from 10/23/2018, which is down slightly from a value of 17.7 on 2018 which was in the context of the patient's urinary tract infection.  We discussed options including continued PSA observation versus moving forward with an MRI of the prostate versus moving forward with a transrectal ultrasound-guided prostate biopsy.  After discussion of risks, benefits and alternatives, the patient wishes to recheck his PSA one more time since the PSA did drop some from 17 down to 12.  We will, therefore, see the patient back after the first year with a repeat PSA.  In the meantime, he will call us back next week to get the result of his biopsy performed today.         ROBYN RUST MD             D: 10/24/2018   T: 10/24/2018   MT: MAIDA      Name:     PETER ALLEN   MRN:      [id number]        Account:      [id number]   :      1950           Visit Date:   10/24/2018      Document: [id number]

## 2018-10-24 NOTE — MR AVS SNAPSHOT
After Visit Summary   10/24/2018    Kameron Spear    MRN: [id number]           Patient Information     Date Of Birth          1950        Visit Information        Provider Department      10/24/2018 10:30 AM Berny Messina MD [facility]        Today's Diagnoses     Elevated prostate specific antigen (PSA)    -  1    Gross hematuria           Follow-ups after your visit        Your next 10 appointments already scheduled     Jan 07, 2019  8:30 AM CST   LAB with [facility]   [facility]    [address] [address]   [phone]           Please do not eat 10-12 hours before your appointment if you are coming in fasting for labs on lipids, cholesterol, or glucose (sugar). This does not apply to pregnant women. Water, hot tea and black coffee (with nothing added) are okay. Do not drink other fluids, diet soda or chew gum.            Jan 16, 2019 10:00 AM CST   [facility]    [address] [address]   [phone]              Who to contact     If you have questions or need follow up information about today's clinic visit or your schedule please contact [facility] directly at [phone].  Normal or non-critical lab and imaging results will be communicated to you by MyChart, letter or phone within 4 business days after the clinic has received the results. If you do not hear from us within 7 days, please contact the clinic through MyChart or phone. If you have a critical or abnormal lab result, we will notify you by phone as soon as possible.  Submit refill requests through [employer] or call your pharmacy and they will forward the refill request to us. Please allow 3 business days for your refill to be completed.          Additional Information About Your Visit        Care EveryWhere ID      This is your Care EveryWhere ID. This could be used by other organizations to access your [facility] medical records  [id number]        Your Vitals Were     Pulse Respirations                73 16           Blood Pressure from Last 3 Encounters:   10/24/18 134/71   10/03/18 119/77   09/17/18 144/78    Weight from Last 3 Encounters:   09/17/18 103.4 kg (228 lb)   10/27/11 102.1 kg (225 lb)              We Performed the Following     CYSTOURETHROSCOPY (02666)     Surgical pathology exam [[id number]]        Primary Care Provider Fax #    Physician No Ref-Primary [phone]       No address on file        Equal Access to Services     MANDY BARKER : Hadii jaison Webb, wakaterinada jonesadaha, qaybta kaalmada sarah, tj badillo . So [facility] [phone].    ATENCIÓN: Si habla español, tiene a castillo disposición servicios gratuitos de asistencia lingüística. Llame al [phone].    We comply with applicable federal civil rights laws and [place] laws. We do not discriminate on the basis of race, color, national origin, age, disability, sex, sexual orientation, or gender identity.            Thank you!     Thank you for choosing [facility]  for your care. Our goal is always to provide you with excellent care. Hearing back from our patients is one way we can continue to improve our services. Please take a few minutes to complete the written survey that you may receive in the mail after your visit with us. Thank you!             Your Updated Medication List - Protect others around you: Learn how to safely use, store and throw away your medicines at www.disposemymeds.org.          This list is accurate as of 10/24/18 11:59 PM.  Always use your most recent med list.                   Brand Name Dispense Instructions for use Diagnosis    ciprofloxacin 500 MG tablet    CIPRO    14 tablet    Take 1 tablet (500 mg) by mouth 2 times daily    Acute cystitis with hematuria        Multiple vitamin  s Tabs      Take  by mouth daily.

## 2018-10-24 NOTE — NURSING NOTE
"Initial /71 (BP Location: Right arm, Patient Position: Chair, Cuff Size: Adult Regular)  Pulse 73  Resp 16 Estimated body mass index is 35.44 kg/(m^2) as calculated from the following:    Height as of 9/17/18: 1.708 m (5' 7.25\").    Weight as of 9/17/18: 103.4 kg (228 lb). .    Patient is here for a cysto.   bell rhodes LPN    "

## 2018-10-27 LAB — COPATH REPORT: NORMAL

## 2019-01-09 ENCOUNTER — ALLIED HEALTH/NURSE VISIT (OUTPATIENT)
Dept: FAMILY MEDICINE | Facility: CLINIC | Age: 69
End: 2019-01-09
Payer: COMMERCIAL

## 2019-01-09 DIAGNOSIS — Z71.85 IMMUNIZATION COUNSELING: Primary | ICD-10-CM

## 2019-01-09 DIAGNOSIS — R97.20 ELEVATED PROSTATE SPECIFIC ANTIGEN (PSA): ICD-10-CM

## 2019-01-09 LAB — PSA SERPL-MCNC: 11.2 UG/L (ref 0–4)

## 2019-01-09 PROCEDURE — 36415 COLL VENOUS BLD VENIPUNCTURE: CPT | Performed by: UROLOGY

## 2019-01-09 PROCEDURE — 99207 ZZC NO CHARGE NURSE ONLY: CPT

## 2019-01-09 PROCEDURE — 84153 ASSAY OF PSA TOTAL: CPT | Performed by: UROLOGY

## 2019-01-09 NOTE — NURSING NOTE
Patient came to clinic asking for vaccines that he is due for.     Patient states that he knows he is due for a tetanus vaccine and would like whatever others he is due for.     Informed patient that he is also due for a shingles and pneumonia vaccine.   We discussed that based on his insurance coverage he should check with his pharmacyon coverage for the vaccines.     Jeaneth Montez, CMA

## 2019-01-11 ENCOUNTER — TELEPHONE (OUTPATIENT)
Dept: UROLOGY | Facility: CLINIC | Age: 69
End: 2019-01-11

## 2019-01-11 NOTE — TELEPHONE ENCOUNTER
Reason for Call:  Other call back    Detailed comments: pt calling stating he had his lab work done on Wed 1/9. Would like to get results and also needs to knowif he should schedule a f/u appt w/      Phone Number Patient can be reached at: Cell number on file:    Telephone Information:   Mobile [phone]       Best Time: any     Can we leave a detailed message on this number? YES    Call taken on 1/11/2019 at 2:06 PM by Lillie Carrizales

## 2019-01-11 NOTE — TELEPHONE ENCOUNTER
"Scheduled pt for soonest avail opening for \"regular\" end-of-day appt.  In April.   Advised pt of the number.   Advised RN would ensure Dian is aware of the scheduled and will contact if Provider wishes to change the date (time for cysto) or do any testing ahead of time.    Dian,  Please review.    Faina Trejo RN  [facility]  "

## 2019-01-15 NOTE — TELEPHONE ENCOUNTER
Spoke to patient , results were given, I explained Dr Messina will not be in clinic until Thursday, I will speak to him then, I will call patient with the plan.  bell rhodes LPN

## 2019-07-26 ENCOUNTER — TELEPHONE (OUTPATIENT)
Dept: FAMILY MEDICINE | Facility: CLINIC | Age: 69
End: 2019-07-26

## 2019-07-26 NOTE — TELEPHONE ENCOUNTER
Panel Management Review      Patient has the following on his problem list: None      Composite cancer screening  Chart review shows that this patient is due/due soon for the following Colonoscopy  Summary:    Patient is due/failing the following:   COLONOSCOPY    Action needed:   Patient needs to schedule colonoscopy - order was placed    Type of outreach:    Sent letter.    Questions for provider review:    None                                                                                                                                    Halie Martinez MA

## 2019-07-26 NOTE — LETTER
[facility]  [address] [address]  [phone]    July 26, 2019    Kameron Spear  [address] [address]          Dear Kameron,    --Colon cancer screening is generally recommended in all patients over the age of 50 years of age. This can be with either colonoscopy every 10 years.    If you have had any of these tests at an outside facility, please attempt to get them sent to us so that we can update your record.     Please disregard this notice if you have already scheduled an appointment.     Sincerely,    Mark Young MD/deneen

## 2024-06-17 PROBLEM — Z71.89 ADVANCED DIRECTIVES, COUNSELING/DISCUSSION: Status: RESOLVED | Noted: 2018-09-17 | Resolved: 2024-06-17
